# Patient Record
Sex: FEMALE | Race: ASIAN
[De-identification: names, ages, dates, MRNs, and addresses within clinical notes are randomized per-mention and may not be internally consistent; named-entity substitution may affect disease eponyms.]

---

## 2022-03-30 ENCOUNTER — HOSPITAL ENCOUNTER (OUTPATIENT)
Dept: HOSPITAL 5 - XRAY | Age: 57
Discharge: HOME | End: 2022-03-30
Attending: NURSE PRACTITIONER
Payer: COMMERCIAL

## 2022-03-30 DIAGNOSIS — M50.321: ICD-10-CM

## 2022-03-30 DIAGNOSIS — M50.322: ICD-10-CM

## 2022-03-30 DIAGNOSIS — M50.31: Primary | ICD-10-CM

## 2022-03-30 DIAGNOSIS — M50.323: ICD-10-CM

## 2022-03-30 PROCEDURE — 72040 X-RAY EXAM NECK SPINE 2-3 VW: CPT

## 2022-03-30 NOTE — XRAY REPORT
XR spine cervical 2-3V



INDICATION / CLINICAL INFORMATION:

M54.2 CERVICALGIA.



COMPARISON:

None available.

 

FINDINGS:

BONES/JOINT(S): No acute fracture or subluxation. 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
7.



SOFT TISSUES: No significant abnormality.



ADDITIONAL FINDINGS: None.







Signer Name: Parish Elmore MD 

Signed: 3/30/2022 10:56 AM

Workstation Name: [id number]

## 2022-11-09 ENCOUNTER — OFFICE VISIT (OUTPATIENT)
Dept: URBAN - METROPOLITAN AREA SURGERY CENTER 16 | Facility: SURGERY CENTER | Age: 57
End: 2022-11-09
Payer: COMMERCIAL

## 2022-11-09 DIAGNOSIS — Z12.11 COLON CANCER SCREENING: ICD-10-CM

## 2022-11-09 DIAGNOSIS — K62.89 ACUTE PROCTITIS: ICD-10-CM

## 2022-11-09 PROCEDURE — G8907 PT DOC NO EVENTS ON DISCHARG: HCPCS | Performed by: INTERNAL MEDICINE

## 2022-11-09 PROCEDURE — 45380 COLONOSCOPY AND BIOPSY: CPT | Performed by: INTERNAL MEDICINE
